# Patient Record
Sex: FEMALE | Race: WHITE | HISPANIC OR LATINO
[De-identification: names, ages, dates, MRNs, and addresses within clinical notes are randomized per-mention and may not be internally consistent; named-entity substitution may affect disease eponyms.]

---

## 2020-08-17 PROBLEM — Z00.00 ENCOUNTER FOR PREVENTIVE HEALTH EXAMINATION: Status: ACTIVE | Noted: 2020-08-17

## 2020-08-18 ENCOUNTER — ASOB RESULT (OUTPATIENT)
Age: 35
End: 2020-08-18

## 2020-08-18 ENCOUNTER — APPOINTMENT (OUTPATIENT)
Dept: ANTEPARTUM | Facility: CLINIC | Age: 35
End: 2020-08-18
Payer: MEDICAID

## 2020-08-18 PROCEDURE — 76811 OB US DETAILED SNGL FETUS: CPT

## 2020-09-14 ENCOUNTER — APPOINTMENT (OUTPATIENT)
Dept: ANTEPARTUM | Facility: CLINIC | Age: 35
End: 2020-09-14

## 2022-04-09 ENCOUNTER — EMERGENCY (EMERGENCY)
Facility: HOSPITAL | Age: 37
LOS: 1 days | Discharge: HOME | End: 2022-04-09
Attending: EMERGENCY MEDICINE | Admitting: STUDENT IN AN ORGANIZED HEALTH CARE EDUCATION/TRAINING PROGRAM
Payer: MEDICAID

## 2022-04-09 VITALS
SYSTOLIC BLOOD PRESSURE: 144 MMHG | TEMPERATURE: 98 F | HEART RATE: 86 BPM | RESPIRATION RATE: 16 BRPM | OXYGEN SATURATION: 100 % | DIASTOLIC BLOOD PRESSURE: 70 MMHG

## 2022-04-09 LAB
ALBUMIN SERPL ELPH-MCNC: 4.6 G/DL — SIGNIFICANT CHANGE UP (ref 3.3–5)
ALP SERPL-CCNC: 97 U/L — SIGNIFICANT CHANGE UP (ref 40–120)
ALT FLD-CCNC: 9 U/L — SIGNIFICANT CHANGE UP (ref 4–33)
ANION GAP SERPL CALC-SCNC: 16 MMOL/L — HIGH (ref 7–14)
APPEARANCE UR: CLEAR — SIGNIFICANT CHANGE UP
AST SERPL-CCNC: 15 U/L — SIGNIFICANT CHANGE UP (ref 4–32)
B PERT DNA SPEC QL NAA+PROBE: SIGNIFICANT CHANGE UP
B PERT+PARAPERT DNA PNL SPEC NAA+PROBE: SIGNIFICANT CHANGE UP
BASE EXCESS BLDV CALC-SCNC: -1.2 MMOL/L — SIGNIFICANT CHANGE UP (ref -2–3)
BASOPHILS # BLD AUTO: 0.02 K/UL — SIGNIFICANT CHANGE UP (ref 0–0.2)
BASOPHILS NFR BLD AUTO: 0.2 % — SIGNIFICANT CHANGE UP (ref 0–2)
BILIRUB SERPL-MCNC: 0.2 MG/DL — SIGNIFICANT CHANGE UP (ref 0.2–1.2)
BILIRUB UR-MCNC: NEGATIVE — SIGNIFICANT CHANGE UP
BLOOD GAS VENOUS COMPREHENSIVE RESULT: SIGNIFICANT CHANGE UP
BORDETELLA PARAPERTUSSIS (RAPRVP): SIGNIFICANT CHANGE UP
BUN SERPL-MCNC: 9 MG/DL — SIGNIFICANT CHANGE UP (ref 7–23)
C PNEUM DNA SPEC QL NAA+PROBE: SIGNIFICANT CHANGE UP
CALCIUM SERPL-MCNC: 8.8 MG/DL — SIGNIFICANT CHANGE UP (ref 8.4–10.5)
CHLORIDE BLDV-SCNC: 110 MMOL/L — HIGH (ref 96–108)
CHLORIDE SERPL-SCNC: 107 MMOL/L — SIGNIFICANT CHANGE UP (ref 98–107)
CO2 BLDV-SCNC: 22.2 MMOL/L — SIGNIFICANT CHANGE UP (ref 22–26)
CO2 SERPL-SCNC: 18 MMOL/L — LOW (ref 22–31)
COLOR SPEC: COLORLESS — SIGNIFICANT CHANGE UP
CREAT SERPL-MCNC: 0.51 MG/DL — SIGNIFICANT CHANGE UP (ref 0.5–1.3)
DIFF PNL FLD: NEGATIVE — SIGNIFICANT CHANGE UP
EGFR: 123 ML/MIN/1.73M2 — SIGNIFICANT CHANGE UP
EOSINOPHIL # BLD AUTO: 0.02 K/UL — SIGNIFICANT CHANGE UP (ref 0–0.5)
EOSINOPHIL NFR BLD AUTO: 0.2 % — SIGNIFICANT CHANGE UP (ref 0–6)
FLUAV SUBTYP SPEC NAA+PROBE: SIGNIFICANT CHANGE UP
FLUBV RNA SPEC QL NAA+PROBE: SIGNIFICANT CHANGE UP
GAS PNL BLDV: 138 MMOL/L — SIGNIFICANT CHANGE UP (ref 136–145)
GLUCOSE BLDV-MCNC: 100 MG/DL — HIGH (ref 70–99)
GLUCOSE SERPL-MCNC: 105 MG/DL — HIGH (ref 70–99)
GLUCOSE UR QL: NEGATIVE — SIGNIFICANT CHANGE UP
HADV DNA SPEC QL NAA+PROBE: SIGNIFICANT CHANGE UP
HCO3 BLDV-SCNC: 21 MMOL/L — LOW (ref 22–29)
HCOV 229E RNA SPEC QL NAA+PROBE: SIGNIFICANT CHANGE UP
HCOV HKU1 RNA SPEC QL NAA+PROBE: SIGNIFICANT CHANGE UP
HCOV NL63 RNA SPEC QL NAA+PROBE: SIGNIFICANT CHANGE UP
HCOV OC43 RNA SPEC QL NAA+PROBE: SIGNIFICANT CHANGE UP
HCT VFR BLD CALC: 32.9 % — LOW (ref 34.5–45)
HCT VFR BLDA CALC: 33 % — LOW (ref 34.5–46.5)
HETEROPH AB TITR SER AGGL: NEGATIVE — SIGNIFICANT CHANGE UP
HGB BLD CALC-MCNC: 11 G/DL — LOW (ref 11.5–15.5)
HGB BLD-MCNC: 10.9 G/DL — LOW (ref 11.5–15.5)
HMPV RNA SPEC QL NAA+PROBE: SIGNIFICANT CHANGE UP
HPIV1 RNA SPEC QL NAA+PROBE: SIGNIFICANT CHANGE UP
HPIV2 RNA SPEC QL NAA+PROBE: SIGNIFICANT CHANGE UP
HPIV3 RNA SPEC QL NAA+PROBE: SIGNIFICANT CHANGE UP
HPIV4 RNA SPEC QL NAA+PROBE: SIGNIFICANT CHANGE UP
IANC: 5.28 K/UL — SIGNIFICANT CHANGE UP (ref 1.8–7.4)
IMM GRANULOCYTES NFR BLD AUTO: 0.2 % — SIGNIFICANT CHANGE UP (ref 0–1.5)
KETONES UR-MCNC: ABNORMAL
LACTATE BLDV-MCNC: 1.6 MMOL/L — SIGNIFICANT CHANGE UP (ref 0.5–2)
LEUKOCYTE ESTERASE UR-ACNC: NEGATIVE — SIGNIFICANT CHANGE UP
LYMPHOCYTES # BLD AUTO: 2.46 K/UL — SIGNIFICANT CHANGE UP (ref 1–3.3)
LYMPHOCYTES # BLD AUTO: 29.1 % — SIGNIFICANT CHANGE UP (ref 13–44)
M PNEUMO DNA SPEC QL NAA+PROBE: SIGNIFICANT CHANGE UP
MAGNESIUM SERPL-MCNC: 2 MG/DL — SIGNIFICANT CHANGE UP (ref 1.6–2.6)
MCHC RBC-ENTMCNC: 28.5 PG — SIGNIFICANT CHANGE UP (ref 27–34)
MCHC RBC-ENTMCNC: 33.1 GM/DL — SIGNIFICANT CHANGE UP (ref 32–36)
MCV RBC AUTO: 86.1 FL — SIGNIFICANT CHANGE UP (ref 80–100)
MONOCYTES # BLD AUTO: 0.64 K/UL — SIGNIFICANT CHANGE UP (ref 0–0.9)
MONOCYTES NFR BLD AUTO: 7.6 % — SIGNIFICANT CHANGE UP (ref 2–14)
NEUTROPHILS # BLD AUTO: 5.28 K/UL — SIGNIFICANT CHANGE UP (ref 1.8–7.4)
NEUTROPHILS NFR BLD AUTO: 62.7 % — SIGNIFICANT CHANGE UP (ref 43–77)
NITRITE UR-MCNC: NEGATIVE — SIGNIFICANT CHANGE UP
NRBC # BLD: 0 /100 WBCS — SIGNIFICANT CHANGE UP
NRBC # FLD: 0 K/UL — SIGNIFICANT CHANGE UP
PCO2 BLDV: 28 MMHG — LOW (ref 39–42)
PH BLDV: 7.49 — HIGH (ref 7.32–7.43)
PH UR: 6.5 — SIGNIFICANT CHANGE UP (ref 5–8)
PLATELET # BLD AUTO: 227 K/UL — SIGNIFICANT CHANGE UP (ref 150–400)
PO2 BLDV: 36 MMHG — SIGNIFICANT CHANGE UP
POTASSIUM BLDV-SCNC: 3.2 MMOL/L — LOW (ref 3.5–5.1)
POTASSIUM SERPL-MCNC: 3.6 MMOL/L — SIGNIFICANT CHANGE UP (ref 3.5–5.3)
POTASSIUM SERPL-SCNC: 3.6 MMOL/L — SIGNIFICANT CHANGE UP (ref 3.5–5.3)
PROT SERPL-MCNC: 7.5 G/DL — SIGNIFICANT CHANGE UP (ref 6–8.3)
PROT UR-MCNC: NEGATIVE — SIGNIFICANT CHANGE UP
RAPID RVP RESULT: SIGNIFICANT CHANGE UP
RBC # BLD: 3.82 M/UL — SIGNIFICANT CHANGE UP (ref 3.8–5.2)
RBC # FLD: 14.6 % — HIGH (ref 10.3–14.5)
RSV RNA SPEC QL NAA+PROBE: SIGNIFICANT CHANGE UP
RV+EV RNA SPEC QL NAA+PROBE: SIGNIFICANT CHANGE UP
SAO2 % BLDV: 69.6 % — SIGNIFICANT CHANGE UP
SARS-COV-2 RNA SPEC QL NAA+PROBE: SIGNIFICANT CHANGE UP
SODIUM SERPL-SCNC: 141 MMOL/L — SIGNIFICANT CHANGE UP (ref 135–145)
SODIUM UR-SCNC: 22 MMOL/L — SIGNIFICANT CHANGE UP
SP GR SPEC: 1 — SIGNIFICANT CHANGE UP (ref 1–1.05)
TSH SERPL-MCNC: 1.5 UIU/ML — SIGNIFICANT CHANGE UP (ref 0.27–4.2)
UROBILINOGEN FLD QL: SIGNIFICANT CHANGE UP
WBC # BLD: 8.44 K/UL — SIGNIFICANT CHANGE UP (ref 3.8–10.5)
WBC # FLD AUTO: 8.44 K/UL — SIGNIFICANT CHANGE UP (ref 3.8–10.5)

## 2022-04-09 PROCEDURE — 93010 ELECTROCARDIOGRAM REPORT: CPT

## 2022-04-09 PROCEDURE — 70487 CT MAXILLOFACIAL W/DYE: CPT | Mod: 26,MA

## 2022-04-09 PROCEDURE — 99285 EMERGENCY DEPT VISIT HI MDM: CPT | Mod: 25

## 2022-04-09 PROCEDURE — 71046 X-RAY EXAM CHEST 2 VIEWS: CPT | Mod: 26

## 2022-04-09 RX ORDER — POTASSIUM PHOSPHATE, MONOBASIC POTASSIUM PHOSPHATE, DIBASIC 236; 224 MG/ML; MG/ML
15 INJECTION, SOLUTION INTRAVENOUS ONCE
Refills: 0 | Status: COMPLETED | OUTPATIENT
Start: 2022-04-09 | End: 2022-04-09

## 2022-04-09 RX ORDER — CALCIUM GLUCONATE 100 MG/ML
2 VIAL (ML) INTRAVENOUS ONCE
Refills: 0 | Status: COMPLETED | OUTPATIENT
Start: 2022-04-09 | End: 2022-04-09

## 2022-04-09 RX ORDER — SODIUM CHLORIDE 9 MG/ML
1000 INJECTION INTRAMUSCULAR; INTRAVENOUS; SUBCUTANEOUS ONCE
Refills: 0 | Status: COMPLETED | OUTPATIENT
Start: 2022-04-09 | End: 2022-04-09

## 2022-04-09 RX ADMIN — SODIUM CHLORIDE 1000 MILLILITER(S): 9 INJECTION INTRAMUSCULAR; INTRAVENOUS; SUBCUTANEOUS at 21:17

## 2022-04-09 RX ADMIN — POTASSIUM PHOSPHATE, MONOBASIC POTASSIUM PHOSPHATE, DIBASIC 62.5 MILLIMOLE(S): 236; 224 INJECTION, SOLUTION INTRAVENOUS at 21:17

## 2022-04-09 RX ADMIN — Medication 200 GRAM(S): at 23:52

## 2022-04-09 NOTE — ED PROVIDER NOTE - PROGRESS NOTE DETAILS
Salo Forbes PGY2: Work up thus far consistent with hypocalcemia and hypophosphatemia. Pt accepted for obs overnight for ongoing repletion. Plan for endo consult in AM. Pt remains HDS. Resting in bed comfortably.

## 2022-04-09 NOTE — ED PROVIDER NOTE - CLINICAL SUMMARY MEDICAL DECISION MAKING FREE TEXT BOX
37 yo F with a pmhx of HTN presents to the ED with generalized weakness over the last 4 days. contrary to triage noted, She denies any dental pain at bedside. vitals non actionable at this time. PE as noted above. lethargic appearing. chvostek sign positive. concerns for metabolic derangement vs endocrine disorder. unlikely dental cause of lethargy. will order labs, imaging, ekg, reassess 35 yo F with a pmhx of HTN presents to the ED with generalized weakness over the last 4 days. contrary to triage noted, She denies any dental pain at bedside. vitals non actionable at this time. PE as noted above. lethargic appearing. chvostek sign positive. concerns for metabolic derangement vs endocrine disorder. unlikely dental cause of lethargy. will order labs, imaging, ekg, reassess    haughey: pt as per note from triage has dental pain but pt denies dental pain, [facility] was given amoxicillin (for dental pain?).  here no dental abnormalities on exam, no facial/ dental swelling.  instead pt appears generalizably weak, with mild hyperventilation.  pt with hyperreflexives in quadricepts, brisk responses.  but also with chovstek's sign with tapped over facial nerve.  pt laying in bed with weakness in moving.  pt g6, p6, breastfeeding infant at home.  pt awake and alert and interactive.  given concernes for electrolyte disorder given chovstek's sign and no labs have returned upgraded from intake to ED main.  care transferred to Dr. Farrar. awaiting labs and disposition.  additional labs sent given concern for hypocalcemia.

## 2022-04-09 NOTE — ED ADULT NURSE NOTE - NSIMPLEMENTINTERV_GEN_ALL_ED
Implemented All Fall with Harm Risk Interventions:  [place] to call system. Call bell, personal items and telephone within reach. Instruct patient to call for assistance. Room bathroom lighting operational. Non-slip footwear when patient is off stretcher. Physically safe environment: no spills, clutter or unnecessary equipment. Stretcher in lowest position, wheels locked, appropriate side rails in place. Provide visual cue, wrist band, yellow gown, etc. Monitor gait and stability. Monitor for mental status changes and reorient to person, place, and time. Review medications for side effects contributing to fall risk. Reinforce activity limits and safety measures with patient and family. Provide visual clues: red socks.

## 2022-04-09 NOTE — ED PROVIDER NOTE - OBJECTIVE STATEMENT
35 yo F with a pmhx of HTN presents to the ED with generalized weakness over the last 4 days. contrary to triage noted, She denies any dental pain at bedside. She admits to oral paresthesia. She admits to increased difficulty swallowing. She denies any abdominal pain. She was seen in [place] ED a few days ago and was dc for follow up with dental. She admits to increased lethargy and generalized weakness. She is able to tolerate po without difficulty. She denies any fevers, chills, N/V/D. She denies any viral prodrome. She denies any CP, SOB, headachces, fnd. She denies any other symptoms at bedside.

## 2022-04-09 NOTE — ED ADULT TRIAGE NOTE - CHIEF COMPLAINT QUOTE
c/o dental pain for 2x weeks, pt c/o throat tightness, no drooling or tongue swelling noted, denies difficulty breathing, able to clear secretions

## 2022-04-09 NOTE — ED ADULT NURSE NOTE - OBJECTIVE STATEMENT
Received pt to intake 10B.   Pt awake, alert and oriented x 4 presents for lethargy, weakness and dental pain seen at OSH yesterday and started on PO abx.    Pt denies fever, but c/o vomiting and diarrhea with decreased PO.    Frequent urine output with very dilute urine.     Urine sent as ordered.    Denies URI symptoms.  Some tachypnea noted but pt also reports feeling anxious.   VSS.    PMH HTN.   Denies pain/burning with urination.    Pt pale in appearance requiring wheelchair to use bathroom due to weakness and paresthesia.    IV and blood work complete.    EKG and repeat EKG complete.    BGL as charted.   Spoke to charge RN at [phone] for placement in main ER.

## 2022-04-09 NOTE — ED ADULT NURSE REASSESSMENT NOTE - NS ED NURSE REASSESS COMMENT FT1
Break coverage RN: Report received from intake RN. PT received to spot 24A. Denies pain at this time: endorses weakness fatigue. PT placed on cardiac monitor: NSR on CM. PT primarily Vietnamese speaking, requesting in English for family at bedside to translate and refusing  services.

## 2022-04-09 NOTE — ED PROVIDER NOTE - ATTENDING CONTRIBUTION TO CARE
jose francisco: pt as per note from triage has dental pain but pt denies dental pain, she has recently been to [place], was given amoxicillin (for dental pain?).  here no dental abnormalities on exam, no facial/ dental swelling.  instead pt appears generalizably weak, with mild hyperventilation.  pt with hyperreflexives in quadricepts, brisk responses.  but also with chovstek's sign with tapped over facial nerve.  pt laying in bed with weakness in moving.  pt g6, p6, breastfeeding infant at home.  pt awake and alert and interactive.  given concernes for electrolyte disorder given chovstek's sign and no labs have returned upgraded from intake to ED main.  care transferred to Dr. Farrar. awaiting labs and disposition.  additional labs sent given concern for hypocalcemia.    I performed a history and physical exam of the patient and discussed their management with the resident and /or advanced care provider. I reviewed the resident and /or ACP's note and agree with the documented findings and plan of care. My medical decison making and observations are found above.

## 2022-04-09 NOTE — ED PROVIDER NOTE - PHYSICAL EXAMINATION
GENERAL: no acute distress  HEAD: normocephalic, atraumatic  HEENT: normal conjunctiva, oral mucosa moist, neck supple  CARDIAC: regular rate and rhythm, normal S1 and S2,  no appreciable murmurs  PULM: clear to ascultation bilaterally, no crackles, rales, rhonchi, or wheezing  GI: abdomen nondistended, soft, nontender, no guarding or rebound tenderness  : no CVA tenderness, no suprapubic tenderness  NEURO: alert and oriented x 3, lethargic appearing, (+) Chvostek's sign   MSK: no visible deformities, no peripheral edema, calf tenderness/redness/swelling  SKIN: no visible rashes, dry, well-perfused  PSYCH: appropriate mood and affect

## 2022-04-10 ENCOUNTER — EMERGENCY (EMERGENCY)
Facility: HOSPITAL | Age: 37
LOS: 1 days | Discharge: HOME | End: 2022-04-10
Attending: STUDENT IN AN ORGANIZED HEALTH CARE EDUCATION/TRAINING PROGRAM | Admitting: STUDENT IN AN ORGANIZED HEALTH CARE EDUCATION/TRAINING PROGRAM
Payer: MEDICAID

## 2022-04-10 VITALS
DIASTOLIC BLOOD PRESSURE: 75 MMHG | OXYGEN SATURATION: 98 % | SYSTOLIC BLOOD PRESSURE: 111 MMHG | TEMPERATURE: 99 F | RESPIRATION RATE: 18 BRPM | HEART RATE: 66 BPM

## 2022-04-10 VITALS
TEMPERATURE: 98 F | HEART RATE: 84 BPM | SYSTOLIC BLOOD PRESSURE: 137 MMHG | OXYGEN SATURATION: 99 % | DIASTOLIC BLOOD PRESSURE: 65 MMHG | RESPIRATION RATE: 15 BRPM

## 2022-04-10 DIAGNOSIS — E83.51 HYPOCALCEMIA: ICD-10-CM

## 2022-04-10 LAB
24R-OH-CALCIDIOL SERPL-MCNC: 8 NG/ML — LOW (ref 30–80)
ANION GAP SERPL CALC-SCNC: 12 MMOL/L — SIGNIFICANT CHANGE UP (ref 7–14)
BUN SERPL-MCNC: 8 MG/DL — SIGNIFICANT CHANGE UP (ref 7–23)
CA-I BLD-SCNC: 1.13 MMOL/L — LOW (ref 1.15–1.29)
CALCIUM SERPL-MCNC: 8.3 MG/DL — LOW (ref 8.4–10.5)
CHLORIDE SERPL-SCNC: 111 MMOL/L — HIGH (ref 98–107)
CO2 SERPL-SCNC: 20 MMOL/L — LOW (ref 22–31)
COHGB MFR BLDV: 0.8 % — SIGNIFICANT CHANGE UP
CREAT SERPL-MCNC: 0.5 MG/DL — SIGNIFICANT CHANGE UP (ref 0.5–1.3)
CULTURE RESULTS: SIGNIFICANT CHANGE UP
EGFR: 124 ML/MIN/1.73M2 — SIGNIFICANT CHANGE UP
GLUCOSE SERPL-MCNC: 87 MG/DL — SIGNIFICANT CHANGE UP (ref 70–99)
HGB BLD CALC-MCNC: 10.1 G/DL — LOW (ref 11.5–15.5)
MAGNESIUM SERPL-MCNC: 2 MG/DL — SIGNIFICANT CHANGE UP (ref 1.6–2.6)
PHOSPHATE SERPL-MCNC: 4.6 MG/DL — HIGH (ref 2.5–4.5)
POTASSIUM SERPL-MCNC: 3.9 MMOL/L — SIGNIFICANT CHANGE UP (ref 3.5–5.3)
POTASSIUM SERPL-SCNC: 3.9 MMOL/L — SIGNIFICANT CHANGE UP (ref 3.5–5.3)
SODIUM SERPL-SCNC: 143 MMOL/L — SIGNIFICANT CHANGE UP (ref 135–145)
SPECIMEN SOURCE: SIGNIFICANT CHANGE UP

## 2022-04-10 PROCEDURE — 99236 HOSP IP/OBS SAME DATE HI 85: CPT

## 2022-04-10 PROCEDURE — 99284 EMERGENCY DEPT VISIT MOD MDM: CPT

## 2022-04-10 RX ORDER — ERGOCALCIFEROL 1.25 MG/1
1 CAPSULE ORAL
Qty: 24 | Refills: 0
Start: 2022-04-10

## 2022-04-10 RX ORDER — ERGOCALCIFEROL 1.25 MG/1
1 CAPSULE ORAL
Qty: 12 | Refills: 0
Start: 2022-04-10

## 2022-04-10 RX ORDER — ERGOCALCIFEROL 1.25 MG/1
50000 CAPSULE ORAL
Refills: 0 | Status: DISCONTINUED | OUTPATIENT
Start: 2022-04-10 | End: 2022-04-13

## 2022-04-10 RX ORDER — POTASSIUM PHOSPHATE, MONOBASIC POTASSIUM PHOSPHATE, DIBASIC 236; 224 MG/ML; MG/ML
15 INJECTION, SOLUTION INTRAVENOUS ONCE
Refills: 0 | Status: COMPLETED | OUTPATIENT
Start: 2022-04-10 | End: 2022-04-10

## 2022-04-10 RX ORDER — SODIUM CHLORIDE 9 MG/ML
1000 INJECTION INTRAMUSCULAR; INTRAVENOUS; SUBCUTANEOUS
Refills: 0 | Status: DISCONTINUED | OUTPATIENT
Start: 2022-04-10 | End: 2022-04-13

## 2022-04-10 RX ORDER — CALCIUM CARBONATE 500(1250)
1 TABLET ORAL
Qty: 10 | Refills: 0
Start: 2022-04-10 | End: 2022-04-14

## 2022-04-10 RX ORDER — CALCIUM CARBONATE 500(1250)
1 TABLET ORAL ONCE
Refills: 0 | Status: COMPLETED | OUTPATIENT
Start: 2022-04-10 | End: 2022-04-10

## 2022-04-10 RX ADMIN — Medication 1 TABLET(S): at 18:05

## 2022-04-10 RX ADMIN — Medication 1 TABLET(S): at 18:04

## 2022-04-10 RX ADMIN — SODIUM CHLORIDE 150 MILLILITER(S): 9 INJECTION INTRAMUSCULAR; INTRAVENOUS; SUBCUTANEOUS at 06:14

## 2022-04-10 RX ADMIN — ERGOCALCIFEROL 50000 UNIT(S): 1.25 CAPSULE ORAL at 18:05

## 2022-04-10 RX ADMIN — Medication 1 TABLET(S): at 10:54

## 2022-04-10 RX ADMIN — POTASSIUM PHOSPHATE, MONOBASIC POTASSIUM PHOSPHATE, DIBASIC 62.5 MILLIMOLE(S): 236; 224 INJECTION, SOLUTION INTRAVENOUS at 01:43

## 2022-04-10 NOTE — ED CDU PROVIDER DISPOSITION NOTE - PATIENT PORTAL LINK FT
You can access the FollowMyHealth Patient Portal offered by [facility] by registering at the following website: [email or web address]. By joining [employer]’s FollowMyHealth portal, you will also be able to view your health information using other applications (apps) compatible with our system.

## 2022-04-10 NOTE — ED CDU PROVIDER INITIAL DAY NOTE - PHYSICAL EXAMINATION
CONSTITUTIONAL:  Well appearing, awake, alert, oriented to person, place, time/situation and in no apparent distress.  Pt. is objectively comfortable appearing and verbalizing in full, clear, effortless sentences.  ENMT: NC/AT.  Airway patent.  Nasal mucosa clear.  Moist mucous membranes.  Neck supple.  EYES:  Clear OU.  CARDIAC:  Normal rate, regular rhythm.  Heart sounds S1 S2.  No murmurs, gallops, or rubs.  RESPIRATORY:  Breath sounds clear and equal bilaterally.  No wheezes, no rales, no rhonchi.  GASTROINTESTINAL:  Abdomen soft, non-distended, non-tender.  No rebound, no guarding.  NEUROLOGICAL:  Alert and oriented to person/place/time/situation.  No focal deficits.  MUSCULOSKELETAL:  Range of motion is not limited.    SKIN:  Skin color unremarkable.  Skin warm, dry, and intact.    PSYCHIATRIC:  Alert and oriented to person/place/time/situation.  Mood and affect WNL.  No apparent risk to self or others.

## 2022-04-10 NOTE — CONSULT NOTE ADULT - SUBJECTIVE AND OBJECTIVE BOX
HPI:  37 y/o F around 1 year postpartum presented to the ED with generalized weakness over the last 4 days. Also reports some SOB and fatigue, increased lethargy and generalized weakness. She is able to tolerate po without difficulty. She denies any fevers, chills, N/V/D. She denies any viral prodrome. She denies any CP, SOB, HA. Denies stridor. CT in ED shows no abnormalities, patent airway.   Found to have low corrected calcium of 8.4. Currently breast feeding. Not on any calcium or vit D supplementation. No prior hx of hypocalcemia or calcium disorders. No hx of neck surgery or radiation to the head or neck. Has noticed some numbness around her mouth and tingling of the feet and toes. No cramping of her hands. Eats cheese and yogurt. Drinks milk. No recent infections. No autoimmune disorders. No family hx of calcium disorders.          PAST MEDICAL & SURGICAL HISTORY:  HTN (hypertension)    No significant past surgical history        FAMILY HISTORY:  No pertinent family history in first degree relatives        Social History: No tobacco or alcohol use    Outpatient Medications:    MEDICATIONS  (STANDING):  amoxicillin  500 milliGRAM(s)/clavulanate 1 Tablet(s) Oral every 8 hours  ergocalciferol [id number] Unit(s) Oral every week  sodium chloride 0.9%. 1000 milliLiter(s) (150 mL/Hr) IV Continuous <Continuous>    MEDICATIONS  (PRN):      Allergies    No Known Allergies    Intolerances      Review of Systems:  Constitutional: No fever, No change in weight +fatigue  Eyes: No blurry vision  Neuro: No headache, +paresthesias mouth and feet  HEENT: No throat pain  Cardiovascular: No chest pain  Respiratory: +SOB  GI: No nausea or vomiting  : No polyuria  Skin: no rash  Psych: no depression  Endocrine: No polydipsia, No heat or cold intolerance, rest as noted in HPI  Hem/lymph: no swelling    All other review of systems negative      PHYSICAL EXAM:  VITALS: T(C): 36.9 (04-10-22 @ 14:30)  T(F): 98.5 (04-10-22 @ 14:30), Max: 99.6 (04-09-22 @ 21:00)  HR: 64 (04-10-22 @ 14:30) (61 - 73)  BP: 116/69 (04-10-22 @ 14:30) (116/69 - 135/70)  RR:  (17 - 20)  SpO2:  (98% - 100%)  Wt(kg): --  GENERAL: NAD at this time  EYES: No proptosis, EOMI  HEENT:  Atraumatic, Normocephalic,   THYROID: Normal size, no palpable nodules  RESPIRATORY: Clear to auscultation bilaterally, full excursion, non-labored  CARDIOVASCULAR: Regular rhythm; No murmurs; no peripheral edema  GI: Soft, nontender, non distended, normal bowel sounds  SKIN: Dry, intact, No rashes or lesions  MUSCULOSKELETAL: normal strength  NEURO: follows commands, neg. Chvotek's  PSYCH: Alert and oriented x 3, normal affect, normal mood  CUSHING'S SIGNS: no striae      POCT Blood Glucose.: 96 mg/dL (04-09-22 @ 16:31)                              10.9   8.44  )-----------( 227      ( 09 Apr 2022 16:58 )             32.9       04-10    143  |  111<H>  |  8   ----------------------------<  87  3.9   |  20<L>  |  0.50    EGFR if : x   EGFR if non : x     Ca    8.3<L>      04-10  Mg     2.00     04-10  Phos  4.6     04-10    TPro  x   /  Alb  3.7  /  TBili  x   /  DBili  x   /  AST  x   /  ALT  x   /  AlkPhos  x   04-10    Thyroid Function Tests:  04-09 @ 16:58 TSH 1.50 FreeT4 -- T3 -- Anti TPO -- Anti Thyroglobulin Ab -- TSI --            Radiology:             
HPI:  Patient is a 36y Female with PMH significant for HTN presents to the ED with generalized weakness over the last 4 days. Also reports some SOB and fatigue, increased lethargy and generalized weakness. She is able to tolerate po without difficulty. She denies any fevers, chills, N/V/D. She denies any viral prodrome. She denies any CP, SOB, HA. Denies stridor. CT in ED shows no abnormalities, patent airway.       Physical Exam  T(C): 37.1 (04-09-22 @ 22:08), Max: 37.1 (04-09-22 @ 22:08)  HR: 73 (04-09-22 @ 22:08) (68 - 86)  BP: 126/89 (04-09-22 @ 22:08) (126/89 - 144/70)  RR: 17 (04-09-22 @ 22:08) (16 - 20)  SpO2: 100% (04-09-22 @ 22:08) (98% - 100%)  General: NAD, A+Ox3  Resp: No respiratory distress, stridor, or stertor  Voice quality: weak but normal  Face:  Symmetric without masses or lesions  OU: EOMI  OC/OP: tongue normal, floor of mouth wnl, no masses or lesions, OP clear  Neck: soft/flat  CNII-XII intact    Laryngoscopy Procedure: risks and benefits of laryngoscopy discussed with the patient. Patient unable to tolerate exam at this time.     A/P: 36y Female PMH HTN p/w fatigue, lethargy, weakness, SOB. CT in ED of neck shows no abnormalities, patent airway. Pt unable to tolerate FOE.  -low suspicion for upper airway etiology   -pt with significant systemic sx, would r/o metabolic vs. other etiology  -no acute ENT intervention at this time      --------------------------------------------------------------  Thank you for the consult,    Zulema Fernandez MD  Resident  Department of Otolaryngology - Head and Neck Surgery  Peds Page #[phone]  Adult Page #[phone]  ---------------------------------------------------------------
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
## 2022-04-10 NOTE — ED CDU PROVIDER DISPOSITION NOTE - NSFOLLOWUPCLINICS_GEN_ALL_ED_FT
[facility]  Endocrinology  [address] [place] [address]  Phone: [phone]  Fax:     [facility]  Endocrinology  [address] [place] [address]  Phone: [phone]  Fax: [phone]
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
## 2022-04-10 NOTE — ED CDU PROVIDER DISPOSITION NOTE - ATTENDING CONTRIBUTION TO CARE
Hero Cutler DO:  patient seen and evaluated with the PA.  I was present for key portions of the History & Physical, and I agree with the Impression & Plan. 37 yo f pw fatigue  and jaw pain and found to have electrolyte abnormalities. likely secondary to malnutrition. eval by felicita. treated. obtained SW assistance for pt, pt ready for dc.

## 2022-04-10 NOTE — ED CDU PROVIDER INITIAL DAY NOTE - ATTENDING CONTRIBUTION TO CARE
37 yo f past medical history htn presents with gen weakness. ED work up sig for hypoCa++, hypophos. exam as above. plan: replete lytes, ivf, endo eval, reassess.

## 2022-04-10 NOTE — ED CDU PROVIDER INITIAL DAY NOTE - MEDICAL DECISION MAKING DETAILS
Tele monitoring, electrolyte repletion, repeat labs, Endocrine consultation 4/10 daytime, general observation care / monitoring.

## 2022-04-10 NOTE — CONSULT NOTE ADULT - PROBLEM SELECTOR RECOMMENDATION 9
Due to vitamin D deficiency  Start ergocaliferol 50,000 IU weekly.  Can given calcium carbonate 600mg BID with food for the next 5 days until Vit D dose starts to help.  Expect improvement in calcium once vitamin D level is optimized to >30.  Follow-up as outpatient. Unclear if symptoms are related to hypocalcemia as hypocalcemia is mild.   Will sign off at this time. Please reconsult if needed.      Thomas Dietz D.O  [phone]

## 2022-04-10 NOTE — ED CDU PROVIDER INITIAL DAY NOTE - NS ED ATTENDING STATEMENT MOD
This was a shared visit with the MARIA ESTHER. I reviewed and verified the documentation and independently performed the documented:

## 2022-04-10 NOTE — ED CDU PROVIDER DISPOSITION NOTE - NSFOLLOWUPINSTRUCTIONS_ED_ALL_ED_FT
Follow up with your Primary Medical Doctor within 2-3days. If results or reports were given to you, show copies of your reports given to you. Take all of your medications as previously prescribed.    Start ergocaliferol 50,000 IU ( Vitamin D2) weekly.  Take Calcium carbonate 600mg BID with food for the next 5 days    If any worsening, new, concerning symptoms return to the ER.      Hipocalcemia en los adultos    Hypocalcemia, Adult      La hipocalcemia ocurre cuando el nivel de calcio en la greg de harmony persona está por debajo de lo normal. El calcio es un mineral que el organismo utiliza de muchas formas. No tener suficiente calcio en la greg puede afectar el sistema nervioso. [place] puede causar problemas en los músculos, el corazón y el cerebro.      ¿Cuáles son las causas?    Esta afección puede ser causada por lo siguiente:  •Deficiencia de vitamina D, de magnesio o de ambos.      •Disminución de los niveles de la hormona paratiroidea (hipoparatiroidismo).      •Problemas en la función renal.      •Bajos niveles de harmony proteína del organismo llamada albúmina.      •Inflamación del páncreas (pancreatitis).      •No graciela la cantidad suficiente de vitaminas y minerales en la dieta o tener problemas intestinales que interfieren con la absorción de nutrientes.      •Ciertos medicamentos.        ¿Cuáles son los signos o los síntomas?    Es posible que algunas personas no presenten síntomas, especialmente si tienen hipocalcemia desde hace un rodríguez tiempo (crónica).    Los síntomas de esta afección pueden incluir los siguientes:  •Entumecimiento y hormigueo de los dedos de los pies o alrededor de la boca.      •Espasmos, jackie o calambres musculares, especialmente en las piernas, los pies y la espalda.      •Espasmo en la laringe (laringoespasmo). [place] puede hacer que sea más difícil respirar o hablar.      •Latidos cardíacos rápidos (palpitaciones) y ritmo cardíaco anormal (arritmias).      •Sacudidas incontrolables (convulsiones).      •Problemas de memoria, confusión o dificultad para pensar.      •Depresión, ansiedad, irritabilidad o cambios en la personalidad.      Los síntomas a rodríguez plazo de esta afección pueden incluir los siguientes:  •Nabila y uñas gruesos y quebradizos.      •Piel seca o enfermedades de la piel de larga duración (psoriasis, eczema o dermatitis).      •Caries dentales.      •Opacidad en el cristalino del kayleen (cataratas).        ¿Cómo se diagnostica?     Por lo general, estefania trastorno se diagnostica mediante un análisis de greg. También se pueden hacer otros estudios para ayudar a determinar la causa preexistente de la afección. Estos pueden incluir más análisis de greg y estudios de diagnóstico por imágenes.      ¿Cómo se trata?    El tratamiento de esta afección puede incluir:  •Calcio administrado por boca (vía oral) o a través de harmony vía intravenosa. El método utilizado para la administración del calcio dependerá de la gravedad de la afección. Si la afección es grave, es posible que deban controlarlo de cerca en el hospital.      •Administración de otros minerales (electrolitos), alex el magnesio.      El uso de otros tratamientos dependerá de la causa de la afección.      Siga estas instrucciones en gutiérrez casa:    •Siga las instrucciones del médico o nutricionista en lo que respecta a la dieta.      •Clementon los suplementos solamente alex se lo haya indicado el médico.      •Concurra a todas las visitas de seguimiento alex se lo haya indicado el médico. [place] es importante.        Comuníquese con un médico si:    •Tiene un aumento de los espasmos o calambres musculares.      •Observa harmony nueva hinchazón en los pies, tobillos o piernas.      •Sufre cambios en el estado de ánimo, en la memoria o en la personalidad.        Solicite ayuda inmediatamente si:    •Siente dolor en el pecho.      •Siente latidos cardíacos rápidos o irregulares de manera persistente.      •Presenta dificultad para respirar.      •Se desmaya.      •Comienza a tener convulsiones.      •Experimenta confusión.        Resumen    •La hipocalcemia ocurre cuando el nivel de calcio en la greg de harmony persona está por debajo de lo normal. No tener suficiente calcio en la greg puede afectar el sistema nervioso. [place] puede causar problemas en los músculos, el corazón y el cerebro.      •Esta afección puede tratarse con la administración de calcio por boca o por vía intravenosa, la administración de otros minerales y el tratamiento de la causa subyacente de la hipocalcemia.      •Clementon los suplementos solamente alex se lo haya indicado el médico.      •Comuníquese con un médico si tiene síntomas nuevos o los síntomas empeoran.      •Concurra a todas las visitas de seguimiento alex se lo haya indicado el médico. [place] es importante.      Esta información no tiene alex fin reemplazar el consejo del médico. Asegúrese de hacerle al médico cualquier pregunta que tenga.        Deficiencia de vitamina D    Vitamin D Deficiency      La deficiencia de vitamina D ocurre cuando el organismo no tiene suficiente vitamina D. La vitamina D es importante para el organismo por muchos motivos:  •Ayuda al organismo a absorber calcio y fósforo, dos minerales importantes.      •Desempeña un papel en la ivonne de los huesos.      •Puede ayudar a prevenir algunas enfermedades, alex la diabetes y la esclerosis múltiple.      •Desempeña un papel en la función muscular, incluida la actividad cardíaca.      Si la deficiencia de vitamina D es grave, puede causar harmony enfermedad que reblandece los huesos. En los adultos, se la conoce alex osteomalacia. En los niños, recibe el nombre de raquitismo.      ¿Cuáles son las causas?    Esta afección puede ser causada por lo siguiente:  •No comer suficientes alimentos que contengan vitamina D.      •No estar expuesto al sol lo suficiente.      •Sufrir ciertas enfermedades del aparato digestivo que le dificultan al organismo la absorción de vitamina D. Estas enfermedades incluyen la enfermedad de Crohn, la pancreatitis crónica y la fibrosis quística.      •Someterse a harmony cirugía en la que se extirpa harmony parte del estómago o del intestino salmon.      •Tener enfermedad renal crónica o enfermedad hepática.        ¿Qué incrementa el riesgo?    Es más probable que desarrolle esta afección en los siguientes casos:  •Es de edad avanzada.      •No pasa mucho tiempo al aire phillip.      •Vive en un centro de atención a rodríguez plazo.      •Ha tenido fracturas de huesos.      •Tiene huesos débiles o finos (osteoporosis).      •Tiene harmony enfermedad o un trastorno que modifica la forma en que el organismo absorbe la vitamina D.      •Tiene piel oscura.      •Lena algunos medicamentos, alex corticoesteroides o determinados anticonvulsivos.      •Tiene sobrepeso u obesidad.        ¿Cuáles son los signos o los síntomas?    En los casos leves de deficiencia de vitamina D, puede no mariano síntomas. Si el trastorno es grave, los síntomas pueden incluir lo siguiente:  •Dolor en los huesos.      •Dolor muscular.      •Caerse con frecuencia.      •Huesos fracturados por harmony lesión rodri.        ¿Cómo se diagnostica?    Esta afección se puede diagnosticar con análisis de greg. También se pueden realizar pruebas de diagnóstico por imágenes, alex radiografías, para detectar cambios en el hueso.      ¿Cómo se trata?    El tratamiento de esta afección puede depender de la causa. Las opciones de tratamiento incluyen las siguientes:  •Graciela suplementos de vitamina D. El médico le sugerirá cuál es la dosis más adecuada para usted.      •Graciela un suplemento de calcio. El médico le sugerirá cuál es la dosis más adecuada para usted.        Siga estas instrucciones en gutiérrez casa:      Comida y bebida    •Consuma alimentos que contengan vitamina D, alex por ejemplo:  •Productos lácteos, cereales o jugos fortificados. El término fortificado significa que se ha añadido vitamina D al alimento. Revise la etiqueta del envase para cynthia si el alimento es fortificado.      •Pescados grasos, alex el salmón o la trucha.      •Huevos.      •Ostras.      •Hongos.        Es posible que los productos mencionados arriba no formen harmony lista completa de las bebidas o los alimentos recomendados. Consulte a un nutricionista para obtener más información.     Instrucciones generales     •Clementon los medicamentos y los suplementos solamente alex se lo haya indicado el médico.      •Reciba yoan solar natural de forma regular y oliveira.      • No utilice griffin juan david.      •Mantenga un peso saludable. Baje de peso, si es necesario.      •Concurra a todas las visitas de seguimiento alex se lo haya indicado el médico. [place] es importante.        ¿Cómo se bhavana?    Para obtener vitamina D, puede hacer lo siguiente:  •Consuma alimentos que contengan naturalmente vitamina D.      •Coma o wei productos que hayan sido fortificados con vitamina D, alex cereales, jugos y productos lácteos (incluida la leche).      •Clementon un suplemento de vitamina D o un suplemento multivitamínico que la contenga.      •Expóngase al sol. El organismo produce vitamina D de forma natural cuando se expone la piel a la yoan del sol. El organismo transforma la yoan solar en harmony forma de vitamina que puede utilizar.        Comuníquese con un médico si:    •Los síntomas no desaparecen.      •Siente náuseas o vomita.      •Tiene menos deposiciones de lo habitual o está estreñido.        Resumen    •La deficiencia de vitamina D ocurre cuando el organismo no tiene suficiente vitamina D.      •La vitamina D es importante para el organismo para la buena ivonne ósea y la función muscular, y puede ayudar a prevenir algunas enfermedades.      •La deficiencia de vitamina D se trata principalmente con suplementos. El médico le sugerirá cuál es la dosis más adecuada para usted.      •Puede obtener vitamina D al consumir alimentos que contengan vitamina D, al estar al sol y al graciela un suplemento de vitamina D o un suplemento multivitamínico que contenga vitamina D.      Esta información no tiene alex fin reemplazar el consejo del médico. Asegúrese de hacerle al médico cualquier pregunta que tenga.

## 2022-04-10 NOTE — ED ADULT TRIAGE NOTE - CHIEF COMPLAINT QUOTE
Pt Latvian speaking,  services utilized, ID# [id number], Geoffrey.  c/o "feeling nervous after breastfeeding my baby which causing me to feel SOB and tired."  Pt appears lethargic, unable to keep eyes open in triage.  Denies any chest pain/SOB.  Endorses dizziness and weakness.  Skin appear dry and pale.  Was discharged today from CDU, was diagnosed with hypocalcemia.  Denies any drugs/ETOH.  Pt is a poor historian.  PMHx:  HTN

## 2022-04-10 NOTE — PROVIDER CONTACT NOTE (OTHER) - ASSESSMENT
Medical team referred this case since pt requested to speak with SW and pt speaks Omani. Writer utilized [employer] ipad -  Lavonne # [id number]. as per the pt " there was a gas leak that's why I am here and I have 6 kids and they are home with my mom and kids father" I was  and had 4 kids with my first  but he passed away and with another man and have 2 kids. pt stated " I wants to get food stamp". pt stated " she is working with  department in the community and was planning to ask them to help her". Writer informed to call SNAP program and get an appoint to met with a live person or can go to SNAP office for further assistance.  pt stated she has seen rats near her house and pt was requested to speak with the land lord and call 311 to make a report. Writer suggested  to go to shelter but pt declined by saying  "my kids are fine and have no  problem or fear in going back to the community and has enough support".

## 2022-04-10 NOTE — CONSULT NOTE ADULT - ASSESSMENT
35 y/o F w/ mild hypocalcemia secondary to Vit D deficiency with Vit D 25-OH of 8 exacerbated likely by lactation

## 2022-04-11 VITALS
HEART RATE: 68 BPM | TEMPERATURE: 98 F | RESPIRATION RATE: 16 BRPM | OXYGEN SATURATION: 100 % | SYSTOLIC BLOOD PRESSURE: 110 MMHG | DIASTOLIC BLOOD PRESSURE: 68 MMHG

## 2022-04-11 LAB
ALBUMIN SERPL ELPH-MCNC: 4.4 G/DL — SIGNIFICANT CHANGE UP (ref 3.3–5)
ALP SERPL-CCNC: 93 U/L — SIGNIFICANT CHANGE UP (ref 40–120)
ALT FLD-CCNC: 11 U/L — SIGNIFICANT CHANGE UP (ref 4–33)
ANION GAP SERPL CALC-SCNC: 15 MMOL/L — HIGH (ref 7–14)
AST SERPL-CCNC: 18 U/L — SIGNIFICANT CHANGE UP (ref 4–32)
BASOPHILS # BLD AUTO: 0.03 K/UL — SIGNIFICANT CHANGE UP (ref 0–0.2)
BASOPHILS NFR BLD AUTO: 0.4 % — SIGNIFICANT CHANGE UP (ref 0–2)
BILIRUB SERPL-MCNC: 0.3 MG/DL — SIGNIFICANT CHANGE UP (ref 0.2–1.2)
BUN SERPL-MCNC: 8 MG/DL — SIGNIFICANT CHANGE UP (ref 7–23)
CALCIUM SERPL-MCNC: 9.4 MG/DL — SIGNIFICANT CHANGE UP (ref 8.4–10.5)
CHLORIDE SERPL-SCNC: 104 MMOL/L — SIGNIFICANT CHANGE UP (ref 98–107)
CO2 SERPL-SCNC: 21 MMOL/L — LOW (ref 22–31)
CREAT SERPL-MCNC: 0.52 MG/DL — SIGNIFICANT CHANGE UP (ref 0.5–1.3)
EBV EA AB SER IA-ACNC: <5 U/ML — SIGNIFICANT CHANGE UP
EBV EA AB TITR SER IF: NEGATIVE — SIGNIFICANT CHANGE UP
EBV EA IGG SER-ACNC: NEGATIVE — SIGNIFICANT CHANGE UP
EBV NA IGG SER IA-ACNC: <3 U/ML — SIGNIFICANT CHANGE UP
EBV PATRN SPEC IB-IMP: SIGNIFICANT CHANGE UP
EBV VCA IGG AVIDITY SER QL IA: POSITIVE
EBV VCA IGM SER IA-ACNC: 62.4 U/ML — HIGH
EBV VCA IGM SER IA-ACNC: 65.9 U/ML — HIGH
EBV VCA IGM SER IA-ACNC: <10 U/ML — SIGNIFICANT CHANGE UP
EBV VCA IGM TITR FLD: NEGATIVE — SIGNIFICANT CHANGE UP
EGFR: 123 ML/MIN/1.73M2 — SIGNIFICANT CHANGE UP
EOSINOPHIL # BLD AUTO: 0.03 K/UL — SIGNIFICANT CHANGE UP (ref 0–0.5)
EOSINOPHIL NFR BLD AUTO: 0.4 % — SIGNIFICANT CHANGE UP (ref 0–6)
GLUCOSE SERPL-MCNC: 109 MG/DL — HIGH (ref 70–99)
HCT VFR BLD CALC: 33.4 % — LOW (ref 34.5–45)
HGB BLD-MCNC: 10.8 G/DL — LOW (ref 11.5–15.5)
HIV-1 VIRAL LOAD RESULT: SIGNIFICANT CHANGE UP
HIV1 RNA # SERPL NAA+PROBE: SIGNIFICANT CHANGE UP COPIES/ML
HIV1 RNA SER-IMP: SIGNIFICANT CHANGE UP
HIV1 RNA SERPL NAA+PROBE-ACNC: SIGNIFICANT CHANGE UP
HIV1 RNA SERPL NAA+PROBE-LOG#: SIGNIFICANT CHANGE UP LG COP/ML
IANC: 5.02 K/UL — SIGNIFICANT CHANGE UP (ref 1.8–7.4)
IMM GRANULOCYTES NFR BLD AUTO: 0.3 % — SIGNIFICANT CHANGE UP (ref 0–1.5)
LYMPHOCYTES # BLD AUTO: 1.97 K/UL — SIGNIFICANT CHANGE UP (ref 1–3.3)
LYMPHOCYTES # BLD AUTO: 25.2 % — SIGNIFICANT CHANGE UP (ref 13–44)
MCHC RBC-ENTMCNC: 29.1 PG — SIGNIFICANT CHANGE UP (ref 27–34)
MCHC RBC-ENTMCNC: 32.3 GM/DL — SIGNIFICANT CHANGE UP (ref 32–36)
MCV RBC AUTO: 90 FL — SIGNIFICANT CHANGE UP (ref 80–100)
MONOCYTES # BLD AUTO: 0.75 K/UL — SIGNIFICANT CHANGE UP (ref 0–0.9)
MONOCYTES NFR BLD AUTO: 9.6 % — SIGNIFICANT CHANGE UP (ref 2–14)
NEUTROPHILS # BLD AUTO: 5.02 K/UL — SIGNIFICANT CHANGE UP (ref 1.8–7.4)
NEUTROPHILS NFR BLD AUTO: 64.1 % — SIGNIFICANT CHANGE UP (ref 43–77)
NRBC # BLD: 0 /100 WBCS — SIGNIFICANT CHANGE UP
NRBC # FLD: 0 K/UL — SIGNIFICANT CHANGE UP
PHOSPHATE SERPL-MCNC: 3.5 MG/DL — SIGNIFICANT CHANGE UP (ref 2.5–4.5)
PLATELET # BLD AUTO: 189 K/UL — SIGNIFICANT CHANGE UP (ref 150–400)
POTASSIUM SERPL-MCNC: 4.1 MMOL/L — SIGNIFICANT CHANGE UP (ref 3.5–5.3)
POTASSIUM SERPL-SCNC: 4.1 MMOL/L — SIGNIFICANT CHANGE UP (ref 3.5–5.3)
PROCALCITONIN SERPL-MCNC: 0.02 NG/ML — SIGNIFICANT CHANGE UP (ref 0.02–0.1)
PROT SERPL-MCNC: 7.3 G/DL — SIGNIFICANT CHANGE UP (ref 6–8.3)
RBC # BLD: 3.71 M/UL — LOW (ref 3.8–5.2)
RBC # FLD: 14.5 % — SIGNIFICANT CHANGE UP (ref 10.3–14.5)
SODIUM SERPL-SCNC: 140 MMOL/L — SIGNIFICANT CHANGE UP (ref 135–145)
VIT D25+D1,25 OH+D1,25 PNL SERPL-MCNC: 85.4 PG/ML — HIGH (ref 19.9–79.3)
WBC # BLD: 7.82 K/UL — SIGNIFICANT CHANGE UP (ref 3.8–10.5)
WBC # FLD AUTO: 7.82 K/UL — SIGNIFICANT CHANGE UP (ref 3.8–10.5)

## 2022-04-11 RX ORDER — SODIUM CHLORIDE 9 MG/ML
1000 INJECTION, SOLUTION INTRAVENOUS ONCE
Refills: 0 | Status: COMPLETED | OUTPATIENT
Start: 2022-04-11 | End: 2022-04-11

## 2022-04-11 RX ADMIN — SODIUM CHLORIDE 1000 MILLILITER(S): 9 INJECTION, SOLUTION INTRAVENOUS at 04:22

## 2022-04-11 RX ADMIN — SODIUM CHLORIDE 1000 MILLILITER(S): 9 INJECTION, SOLUTION INTRAVENOUS at 01:28

## 2022-04-11 RX ADMIN — Medication 0.5 MILLIGRAM(S): at 04:26

## 2022-04-11 NOTE — ED PROVIDER NOTE - PATIENT PORTAL LINK FT
Noam Pepper is a 74 year old male here for  Chief Complaint   Patient presents with   • Consultation     Prostate Cancer      Denies latex allergy or sensitivity.    Medication verified, no changes.  PCP and Pharmacy verified.    Social History     Tobacco Use   Smoking Status Former Smoker   • Packs/day: 0.00   • Types: Cigarettes   • Quit date: 10/22/1984   • Years since quittin.0   Smokeless Tobacco Never Used     Advance Directives Filed: No    ECOG:   ECOG [20 1348]   ECOG Performance Status 0       Height: No.  Ht Readings from Last 1 Encounters:   20 5' 7\" (1.702 m)     Weight:Yes, shoes on.  Wt Readings from Last 3 Encounters:   20 89.1 kg   20 95 kg   10/15/19 96.2 kg       BMI: There is no height or weight on file to calculate BMI.    REVIEW OF SYSTEMS  GENERAL:  Patient denies headache, fevers, chills, night sweats, dizziness, but complains of: excessive fatigue, change in appetite and weight loss  ALLERGIC/IMMUNOLOGIC: Verified allergies: Yes  EYES:  Patient denies significant visual difficulties, double vision, but complains of: blurred vision  ENT/MOUTH: Patient denies problems with hearing, sore throat, sinus drainage, mouth sores  ENDOCRINE:  Patient denies diabetes, thyroid disease, hormone replacement, hot flashes  HEMATOLOGIC/LYMPHATIC: Patient denies easy bruising, bleeding, tender lymph nodes, swollen lymph nodes  BREASTS: Patient denies abnormal masses of breast, nipple discharge, pain  RESPIRATORY:  Patient denies lung pain with breathing, cough, coughing up blood, shortness of breath, but complains of: shortness of breath with exertion  CARDIOVASCULAR:  Patient denies anginal chest pain, palpitations, shortness of breath when lying flat, peripheral edema  GASTROINTESTINAL: Patient denies abdominal pain , nausea, vomiting, diarrhea, GI bleeding, constipation, change in bowel habits, heartburn, sensation of feeling full, difficulty swallowing  : Patient  denies blood in the urine, burning with urination, frequency, urgency, hesitancy, but complains of: incontinence  MUSCULOSKELETAL:  Patient denies joint pain, bone pain, joint swelling, redness, decreased range of motion  SKIN:  Patient denies chronic rashes, inflammation, ulcerations, skin changes, itching  NEUROLOGIC:  Patient denies areas of focal weakness, abnormal gait, sensory problems, numbness, tingling, but complains of: loss of balance  PSYCHIATRIC: Patient denies insomnia, depression, anxiety     You can access the FollowMyHealth Patient Portal offered by [facility] by registering at the following website: [email or web address]. By joining [employer]’s FollowMyHealth portal, you will also be able to view your health information using other applications (apps) compatible with our system.

## 2022-04-11 NOTE — ED PROVIDER NOTE - NSFOLLOWUPCLINICS_GEN_ALL_ED_FT
[facility] - Primary Care  Primary Care  [address] [place] [address]  Phone: [phone]  Fax:

## 2022-04-11 NOTE — ED PROVIDER NOTE - NSFOLLOWUPINSTRUCTIONS_ED_ALL_ED_FT
Fatigue      If you have fatigue, you feel tired all the time and have a lack of energy or a lack of motivation. Fatigue may make it difficult to start or complete tasks because of exhaustion. In general, occasional or mild fatigue is often a normal response to activity or life. However, long-lasting (chronic) or extreme fatigue may be a symptom of a medical condition.    Follow these instructions at home:    General instructions     •Watch your fatigue for any changes.    •Go to bed and get up at the same time every day.    •Avoid fatigue by pacing yourself during the day and getting enough sleep at night.    •Maintain a healthy weight.    Medicines     •Take over-the-counter and prescription medicines only as told by your health care provider.    •Take a multivitamin, if told by your health care provider.     • Do not use herbal or dietary supplements unless they are approved by your health care provider.    Activity      •Exercise regularly, as told by your health care provider.    •Use or practice techniques to help you relax, such as yoga, radha chi, meditation, or massage therapy.    Eating and drinking      •Avoid heavy meals in the evening.    •Eat a well-balanced diet, which includes lean proteins, whole grains, plenty of fruits and vegetables, and low-fat dairy products.    •Avoid consuming too much caffeine.    •Avoid the use of alcohol.    •Drink enough fluid to keep your urine pale yellow.      Lifestyle     •Change situations that cause you stress. Try to keep your work and personal schedule in balance.      • Do not use any products that contain nicotine or tobacco, such as cigarettes and e-cigarettes. If you need help quitting, ask your health care provider.      • Do not use drugs.        Contact a health care provider if:    •Your fatigue does not get better.      •You have a fever.      •You suddenly lose or gain weight.      •You have headaches.      •You have trouble falling asleep or sleeping through the night.      •You feel angry, guilty, anxious, or sad.    •You are unable to have a bowel movement (constipation).    •Your skin is dry.    •You have swelling in your legs or another part of your body.    Get help right away if:    •You feel confused.    •Your vision is blurry.    •You feel faint or you pass out.    •You have a severe headache.    •You have severe pain in your abdomen, your back, or the area between your waist and hips (pelvis).    •You have chest pain, shortness of breath, or an irregular or fast heartbeat.    •You are unable to urinate, or you urinate less than normal.    •You have abnormal bleeding, such as bleeding from the rectum, vagina, nose, lungs, or nipples.    •You vomit blood.    •You have thoughts about hurting yourself or others.    If you ever feel like you may hurt yourself or others, or have thoughts about taking your own life, get help right away. You can go to your nearest emergency department or call:   • Your local emergency services (911 in the [place]     • A suicide crisis helpline, such as the National Suicide Prevention Lifeline at 1-861.337.6489. This is open 24 hours a day.     Summary    •If you have fatigue, you feel tired all the time and have a lack of energy or a lack of motivation.    •Fatigue may make it difficult to start or complete tasks because of exhaustion.    •Long-lasting (chronic) or extreme fatigue may be a symptom of a medical condition.    •Exercise regularly, as told by your health care provider.    •Change situations that cause you stress. Try to keep your work and personal schedule in balance.    This information is not intended to replace advice given to you by your health care provider. Make sure you discuss any questions you have with your health care provider.

## 2022-04-11 NOTE — ED PROVIDER NOTE - PHYSICAL EXAMINATION
GENERAL: tearful and anxious appearing   HEAD: normocephalic, atraumatic  HENT: airway intact, neck supple, oral mucosa dry  EYES: normal conjunctiva, EOMI, PERRL  CARDIAC: regular rate and rhythm, normal S1S2, no appreciable murmurs, 2+ pulses in UE/LE b/l  PULM: normal breath sounds, clear to ascultation bilaterally, no rales, rhonchi, wheezing  GI: abdomen nondistended, soft, nontender, no guarding, rebound tenderness  : no CVA tenderness b/l, no suprapubic tenderness  NEURO: no focal motor or sensory deficits, CN2-12 intact, AAOx3  MSK: no peripheral edema, no calf tenderness b/l  SKIN: well-perfused, extremities warm, no visible rashes

## 2022-04-11 NOTE — ED PROVIDER NOTE - PROGRESS NOTE DETAILS
Nikita, PGY2: patient noted to be tremulous w/ feelings of anxiety. Will trial 0.5 ativan and reassess Nikita, PGY2: patient reassessed after ativan and appears clinically improved. However, patient noted that she feels safe in the hospital and feels unsafe at home because she feels as though "something" might happen to her. Patient very non descriptive with her safety concerns. Will illicit SW help for further evaluation to determine appropriate and safe dispo Subhash CARABALLO: d/w patient, says feels improved. Denies fever, SOB, CP, abdominal pain, diarrhea, muscular aches, joint pain, LOC. Say has no safety concerns at home. SW interviewed before and determined the same. VS wnl. PE wnl. Discussed for discharge plan and F/U with PMD early for recommendations. Patient agrees.

## 2022-04-11 NOTE — ED PROVIDER NOTE - OBJECTIVE STATEMENT
36 F w/ history of HTN presents to the ED with generalized weakness, fatigue and SOB described as "being unable to catch breath secondary to anxiety". States she has been feeling extremely fatigued, anxious and sad for ~ 5 days. Was seen at [facility] and had a CDU stay for hypocalcemia and hypomagnesemia. Patient states at time of discharge, she felt well but when she returned home, she began to feel increasingly overwhelmed and anxious that were exacerbated by the yelling/cries of her children. Notes waxing and waning stress that has been worse as of late but no SI/HI, drug use or alcohol use.

## 2022-04-11 NOTE — ED PROVIDER NOTE - CLINICAL SUMMARY MEDICAL DECISION MAKING FREE TEXT BOX
36 F w/ history of HTN presents to the ED with generalized weakness, fatigue and SOB; VSS. Patient not tachycardic, hypoxic or tachypneic and is saturating well on RA. Patient appears clinically dry w/ depressed affect but no concerning signs for infection at this time. given recent d/c for electrolyte abnormalities, will provided IV hydration + reassess electrolyte abnormalities

## 2022-04-11 NOTE — PROVIDER CONTACT NOTE (OTHER) - ASSESSMENT
Sw met pt  at bedside, alone with  [id number] because provider recommends that pt is in need of social intervention because of her being afraid of returning home . Pt reported that she is afraid of returning home because she is not feeling well, experiences shortness of breath, faint and lack of strength. She is afraid that these symptoms could return while she is at home, and wants to feel better before leaving the hospital . She is more concerned about experiencing these symptoms while home. Pt stated that she is not in any kind of danger and does feel safe with her partner and other members of her household

## 2022-04-11 NOTE — ED PROVIDER NOTE - ATTENDING CONTRIBUTION TO CARE
35 yo f past medical history htn recent visit for weakness presents for weakness, fatigue, "shortness of breath," because of anxiety. denies SI HI, AH, VH.  bedside. exam as above. plan: labs, ivf, ativan prn, reassess. x

## 2022-04-11 NOTE — ED ADULT NURSE NOTE - OBJECTIVE STATEMENT
Pt arrives to room 27 A&Ox4 and ambulatory. Pt C/O new onset of weakness and fatigue. Pt states she is experiencing a new onset of anxiety and overwhelmed r/t her . States symptoms are worse when breast feeding. Pt d/peggy from CDU yesterday for electrolyte imablances. Pt denies SI/HI drug and alcohol use. 20G IV placed in LAC. NSR on cardiac monitor .MD mosley in progress.

## 2022-04-11 NOTE — ED ADULT NURSE NOTE - NSIMPLEMENTINTERV_GEN_ALL_ED
Implemented All Universal Safety Interventions:  Marcus to call system. Call bell, personal items and telephone within reach. Instruct patient to call for assistance. Room bathroom lighting operational. Non-slip footwear when patient is off stretcher. Physically safe environment: no spills, clutter or unnecessary equipment. Stretcher in lowest position, wheels locked, appropriate side rails in place.

## 2022-04-11 NOTE — ED PROVIDER NOTE - NS ED ROS FT
General: fatigue  HENT: denies nasal congestion, rhinorrhea  Eyes: denies visual changes, blurred vision  CV: denies chest pain, palpitations  Resp: SOB  Abdominal: denies nausea, vomiting, diarrhea, abdominal pain  : denies urinary pain or discharge  MSK: denies muscle aches, leg swelling  Neuro: denies headaches, numbness, tingling  Skin: denies rashes, bruises

## 2022-04-11 NOTE — ED ADULT NURSE NOTE - CHIEF COMPLAINT QUOTE
Pt Lao speaking,  services utilized, ID# [id number], Geoffrey.  c/o "feeling nervous after breastfeeding my baby which causing me to feel SOB and tired."  Pt appears lethargic, unable to keep eyes open in triage.  Denies any chest pain/SOB.  Endorses dizziness and weakness.  Skin appear dry and pale.  Was discharged today from CDU, was diagnosed with hypocalcemia.  Denies any drugs/ETOH.  Pt is a poor historian.  PMHx:  HTN

## 2022-04-12 LAB
GLUCOSE BLDC GLUCOMTR-MCNC: 101 MG/DL — HIGH (ref 70–99)

## 2022-11-15 NOTE — ED ADULT NURSE NOTE - NS ED NURSE RECORD ANOTHER VITAL SIGN
Nutrition Assessment   Reason for Consult/Assessment: Wound consult   Diagnosis and Hx: Reviewed  Pertinent Nutrition History: Hx of T2DM                                    Diet Order: Consistent carbohydrate         Diet tolerance: Tolerating with good appetite/intakes recorded   Food Allergies: Yes (Nitrates)    Demographic/Anthropometrics Information  Gender: male   Patient Age: 66 year old  Height:    Ht Readings from Last 1 Encounters:   11/12/22 5' 7\" (1.702 m)      Weight:   Wt Readings from Last 1 Encounters:   11/15/22 106.2 kg      BMI:   BMI Readings from Last 1 Encounters:   11/15/22 36.67 kg/m²          % Weight Change: Weight hx reviewed, stable           Estimated Needs:  Calculated Energy Needs: 9130-6816  kcal               Calculated protein needs: 123-154  g    Calculated Fluid Needs: 1ml/kcal              NFPE  Nutrition Focused Physical Exam  Physical Exam Completed: Yes (visual)  Body Fat  Overall Body Fat: Normal  Orbital Region: Normal  Upper Arm Region (Triceps/biceps): Normal  Thoracic and Lumbar Region (Rib/lower back/midaxillary line): Normal  Muscle Mass  Overall Muscle Mass: Normal  Temporal Region (Temporalis Muscle): Normal  Collarbone Region (Clavicle Bone, Pectoralis Major, Trapezius Muscle): Normal  Shoulder Region (Deltoid Muscle): Normal  Scapular Bone Region (Trapezius, Supraspinatus, Infraspinatus Muscles): Normal  Hand Region: Normal  Patellar Region (Quadriceps Muscle): Normal  Anterior Thigh Region (Quadriceps Muscle): Normal  Posterior Calf Region (Gastrocnemius Muscle): Normal  Skin Assessment/Wounds  Wounds Present: Wounds present             TREATMENT PLAN: Monitoring & Interventions   Intervention: Nutrition education, Nutrition supplement therapy                                                                     Nutrition supplement therapy: Sending max protein supplement w/ lunch daily (150kcal, 30 g protein)  Nutrition education: Discussed importance of managing  blood sugars, consuming adequate protein, vitamin C, zinc for wound healing.    Goal: Meet >/equal 75% estimated needs   Intervention goal status: Initiated  Time frame to achieve goal: By discharge     Dietitian will monitor: Food, beverage, and nutrient intake, Knowledge, beliefs, attitudes      Nutrition Diagnosis / PES  Nutrition Diagnosis: Increased nutrient needs  Related to: Increased nutritional demands for healing   As evidenced by: Calculated needs      Primary Nutrition Diagnosis status: New nutrition diagnosis    Secondary nutrition problem: Food and nutrition related knowledge deficit   Related to: Other (comments) (following a consistent carbohydrate diet) As evidenced by: Other (comments) (Patient report)   Secondary nutrition diagnosis status: New nutrition diagnosis   Yes

## 2023-07-28 NOTE — ED PROVIDER NOTE - NSICDXNOPASTSURGICALHX_GEN_ALL_ED
This medical record reflects one or more clinical indicators suggestive of morbid obesity. 360 Statement: Pt BMI currently at 41.02. Pt wt has increased during admittance. Pt previous wt was about 20 lbs lower. There is no morbid obesity DX at this time. BMI Findings:  Adult BMI Classifications: Morbid Obesity 40-44.9        Body mass index is 41.02 kg/m². See Nutrition note dated 7/28/23 for additional details. Completed nutrition assessment is viewable in the nutrition documentation. <-- Click to add NO significant Past Surgical History

## 2023-11-13 RX ORDER — IBUPROFEN 200 MG
1 TABLET ORAL
Qty: 0 | Refills: 0 | DISCHARGE

## 2025-05-31 ENCOUNTER — EMERGENCY (EMERGENCY)
Facility: HOSPITAL | Age: 40
LOS: 1 days | End: 2025-05-31
Attending: EMERGENCY MEDICINE | Admitting: EMERGENCY MEDICINE
Payer: MEDICAID

## 2025-05-31 VITALS
SYSTOLIC BLOOD PRESSURE: 110 MMHG | TEMPERATURE: 98 F | OXYGEN SATURATION: 98 % | RESPIRATION RATE: 18 BRPM | DIASTOLIC BLOOD PRESSURE: 54 MMHG | HEART RATE: 96 BPM

## 2025-05-31 VITALS
OXYGEN SATURATION: 99 % | DIASTOLIC BLOOD PRESSURE: 74 MMHG | HEART RATE: 115 BPM | RESPIRATION RATE: 24 BRPM | TEMPERATURE: 98 F | SYSTOLIC BLOOD PRESSURE: 142 MMHG

## 2025-05-31 LAB
ALBUMIN SERPL ELPH-MCNC: 4.3 G/DL — SIGNIFICANT CHANGE UP (ref 3.3–5)
ALP SERPL-CCNC: 71 U/L — SIGNIFICANT CHANGE UP (ref 40–120)
ALT FLD-CCNC: 12 U/L — SIGNIFICANT CHANGE UP (ref 4–33)
ANION GAP SERPL CALC-SCNC: 16 MMOL/L — HIGH (ref 7–14)
APPEARANCE UR: CLEAR — SIGNIFICANT CHANGE UP
AST SERPL-CCNC: 17 U/L — SIGNIFICANT CHANGE UP (ref 4–32)
BACTERIA # UR AUTO: ABNORMAL /HPF
BASOPHILS # BLD AUTO: 0.02 K/UL — SIGNIFICANT CHANGE UP (ref 0–0.2)
BASOPHILS NFR BLD AUTO: 0.2 % — SIGNIFICANT CHANGE UP (ref 0–2)
BILIRUB SERPL-MCNC: <0.2 MG/DL — SIGNIFICANT CHANGE UP (ref 0.2–1.2)
BILIRUB UR-MCNC: NEGATIVE — SIGNIFICANT CHANGE UP
BUN SERPL-MCNC: 11 MG/DL — SIGNIFICANT CHANGE UP (ref 7–23)
CALCIUM SERPL-MCNC: 8.8 MG/DL — SIGNIFICANT CHANGE UP (ref 8.4–10.5)
CAST: 0 /LPF — SIGNIFICANT CHANGE UP (ref 0–4)
CHLORIDE SERPL-SCNC: 106 MMOL/L — SIGNIFICANT CHANGE UP (ref 98–107)
CO2 SERPL-SCNC: 21 MMOL/L — LOW (ref 22–31)
COLOR SPEC: YELLOW — SIGNIFICANT CHANGE UP
CREAT SERPL-MCNC: 0.55 MG/DL — SIGNIFICANT CHANGE UP (ref 0.5–1.3)
DIFF PNL FLD: NEGATIVE — SIGNIFICANT CHANGE UP
EGFR: 118 ML/MIN/1.73M2 — SIGNIFICANT CHANGE UP
EGFR: 118 ML/MIN/1.73M2 — SIGNIFICANT CHANGE UP
EOSINOPHIL # BLD AUTO: 0.03 K/UL — SIGNIFICANT CHANGE UP (ref 0–0.5)
EOSINOPHIL NFR BLD AUTO: 0.3 % — SIGNIFICANT CHANGE UP (ref 0–6)
GLUCOSE SERPL-MCNC: 128 MG/DL — HIGH (ref 70–99)
GLUCOSE UR QL: NEGATIVE MG/DL — SIGNIFICANT CHANGE UP
HCG SERPL-ACNC: <1 MIU/ML — SIGNIFICANT CHANGE UP
HCT VFR BLD CALC: 31.9 % — LOW (ref 34.5–45)
HGB BLD-MCNC: 10.5 G/DL — LOW (ref 11.5–15.5)
IANC: 6.56 K/UL — SIGNIFICANT CHANGE UP (ref 1.8–7.4)
IMM GRANULOCYTES NFR BLD AUTO: 0.2 % — SIGNIFICANT CHANGE UP (ref 0–0.9)
KETONES UR QL: ABNORMAL MG/DL
LEUKOCYTE ESTERASE UR-ACNC: ABNORMAL
LYMPHOCYTES # BLD AUTO: 2.03 K/UL — SIGNIFICANT CHANGE UP (ref 1–3.3)
LYMPHOCYTES # BLD AUTO: 21.6 % — SIGNIFICANT CHANGE UP (ref 13–44)
MAGNESIUM SERPL-MCNC: 1.9 MG/DL — SIGNIFICANT CHANGE UP (ref 1.6–2.6)
MCHC RBC-ENTMCNC: 26.7 PG — LOW (ref 27–34)
MCHC RBC-ENTMCNC: 32.9 G/DL — SIGNIFICANT CHANGE UP (ref 32–36)
MCV RBC AUTO: 81.2 FL — SIGNIFICANT CHANGE UP (ref 80–100)
MONOCYTES # BLD AUTO: 0.72 K/UL — SIGNIFICANT CHANGE UP (ref 0–0.9)
MONOCYTES NFR BLD AUTO: 7.7 % — SIGNIFICANT CHANGE UP (ref 2–14)
NEUTROPHILS # BLD AUTO: 6.56 K/UL — SIGNIFICANT CHANGE UP (ref 1.8–7.4)
NEUTROPHILS NFR BLD AUTO: 70 % — SIGNIFICANT CHANGE UP (ref 43–77)
NITRITE UR-MCNC: NEGATIVE — SIGNIFICANT CHANGE UP
NRBC # BLD AUTO: 0 K/UL — SIGNIFICANT CHANGE UP (ref 0–0)
NRBC # FLD: 0 K/UL — SIGNIFICANT CHANGE UP (ref 0–0)
NRBC BLD AUTO-RTO: 0 /100 WBCS — SIGNIFICANT CHANGE UP (ref 0–0)
PH UR: 8 — SIGNIFICANT CHANGE UP (ref 5–8)
PLATELET # BLD AUTO: 269 K/UL — SIGNIFICANT CHANGE UP (ref 150–400)
POTASSIUM SERPL-MCNC: 3.6 MMOL/L — SIGNIFICANT CHANGE UP (ref 3.5–5.3)
POTASSIUM SERPL-SCNC: 3.6 MMOL/L — SIGNIFICANT CHANGE UP (ref 3.5–5.3)
PROT SERPL-MCNC: 7.7 G/DL — SIGNIFICANT CHANGE UP (ref 6–8.3)
PROT UR-MCNC: SIGNIFICANT CHANGE UP MG/DL
RBC # BLD: 3.93 M/UL — SIGNIFICANT CHANGE UP (ref 3.8–5.2)
RBC # FLD: 14.9 % — HIGH (ref 10.3–14.5)
RBC CASTS # UR COMP ASSIST: 3 /HPF — SIGNIFICANT CHANGE UP (ref 0–4)
SODIUM SERPL-SCNC: 143 MMOL/L — SIGNIFICANT CHANGE UP (ref 135–145)
SP GR SPEC: 1.02 — SIGNIFICANT CHANGE UP (ref 1–1.03)
SQUAMOUS # UR AUTO: 4 /HPF — SIGNIFICANT CHANGE UP (ref 0–5)
TROPONIN T, HIGH SENSITIVITY RESULT: <6 NG/L — SIGNIFICANT CHANGE UP
UROBILINOGEN FLD QL: 1 MG/DL — SIGNIFICANT CHANGE UP (ref 0.2–1)
WBC # BLD: 9.38 K/UL — SIGNIFICANT CHANGE UP (ref 3.8–10.5)
WBC # FLD AUTO: 9.38 K/UL — SIGNIFICANT CHANGE UP (ref 3.8–10.5)
WBC UR QL: 0 /HPF — SIGNIFICANT CHANGE UP (ref 0–5)

## 2025-05-31 PROCEDURE — 71046 X-RAY EXAM CHEST 2 VIEWS: CPT | Mod: 26

## 2025-05-31 PROCEDURE — 99285 EMERGENCY DEPT VISIT HI MDM: CPT

## 2025-05-31 PROCEDURE — 93010 ELECTROCARDIOGRAM REPORT: CPT

## 2025-05-31 RX ADMIN — Medication 1000 MILLILITER(S): at 22:29

## 2025-05-31 NOTE — ED ADULT NURSE NOTE - CHIEF COMPLAINT QUOTE
pt c/o sob, generalized weakness. Son states pt hasn't been sleeping. Denies know med hx or daily meds. pt hyperventilating, minimally cooperative with triage. unable to obtain EKG taken to ambulance area

## 2025-05-31 NOTE — ED PROVIDER NOTE - CLINICAL SUMMARY MEDICAL DECISION MAKING FREE TEXT BOX
Plan EKG, labs, chest x-ray and reassess.  EKG heart rate 98 sinus with T wave inversion in lead III and V3.  Advised patient that she will need to follow-up with crisis center once labs are resulted.  Patient ANO x 3 agreeable with plan.

## 2025-05-31 NOTE — ED ADULT NURSE NOTE - OBJECTIVE STATEMENT
Pt brought to room 5 with son.  Pt c/o SOB, generalized weakness.  Son states pt hasn't been sleeping well.  Pt appears anxious and was hyperventilating upon arrival.  Pt able to be calmed with verbal support from staff.  Pt denies daily meds or medical history.  EKG performed in triage.  Pt seen yesterday at [facility] and discharged with plan to see PCP on June 12th.  Pt reports yesterday she felt numbness to left arm and feeling like her throat was closing.  Pt denies those symptoms this evening.  States she vomited after eating earlier today.  Pt was tachy on arrival but now HR is WNL on cardiac monitor with NSR.  Pt c/o of generalized weakness and appears less anxious.  20g iv placed to right AC.  Labs drawn and sent.  Pt awaiting XR.

## 2025-05-31 NOTE — ED PROVIDER NOTE - NSFOLLOWUPINSTRUCTIONS_ED_ALL_ED_FT
Hoy lo evaluaron en Urgencias por debilidad generalizada, entumecimiento y dificultad para respirar. Gutiérrez evaluación indicó que karina síntomas probablemente se deban a ansiedad.    Por favor, acuda al [facility] el lunes para cynthia a un psiquiatra. El horario es de 9:00 a. m. a 3:00 p. m.    [address] [place] [address], [facility], [place] [address]. [phone].    También le recomendamos programar harmony aquiles con gutiérrez médico de cabecera, si es posible, dentro de las 48 a 72 horas.    Regrese a Urgencias si presenta dolor en el pecho, aumento de la dificultad para respirar o cualquier otro síntoma preocupante.    Lynne por elegirnos para gutiérrez atención.    ___    You have been evaluated in the Emergency Department today for generalized weakness, numbness, and difficulty breathing. Your evaluation showed that your symptoms are likely due to anxiety.    Please go to the [facility] on Monday to see a psychiatrist. They are open 9 AM to 3 PM.   -[address] [place] [address], [address] Floor, [place] [address]. [phone].    Please also schedule an appointment with your primary care physician, within 48-72 hours if possible.    Return to the Emergency Department if you experience chest pain, worsening shortness of breath, or any other concerning symptoms.    Thank you for choosing us for your care.

## 2025-05-31 NOTE — ED PROVIDER NOTE - OBJECTIVE STATEMENT
[FreeTextEntry1] : Possible Infection   {JR } Dr Shelton  40-year-old female denies any significant past medical history presents from home with son chief complaint of generalized weakness, a tightness in her throat and discomfort of both of her hands like a tingling sensation left greater than right.  She has been feeling very anxious for the last couple days, went to another hospital yesterday was evaluated and discharged.  Told to follow-up with a primary doctor on June 12.  But symptoms for continued prompted ER visit.  Spoke to patient's son Gael at bedside and patient in Khmer at bedside patient states she has had anxiety before.  Endorsing sadness, cries and feel weak overall.  But denies any SI or HI.  Denies any auditory visual hallucinations.  Denies any alcohol or drug use.  Has never been evaluated by psychiatrist or therapist but is willing to see 1.  Currently not on medications.  Denies chest pain denies abdominal pain denies dysuria states she is urinating more frequently states "after you urinate I feel calm".  Denies pregnancy last menstrual cycle was 5/26/2025.  Patient at home is taking Tylenol, ibuprofen, Augmentin for recent tooth extraction and acyclovir for unknown etiology.

## 2025-05-31 NOTE — ED ADULT TRIAGE NOTE - CHIEF COMPLAINT QUOTE
pt c/o sob, generalized weakness. Son states pt hasn't been sleeping. Denies know med hx or daily meds. pt hyperventilating, minimally cooperative with triage. pt c/o sob, generalized weakness. Son states pt hasn't been sleeping. Denies know med hx or daily meds. pt hyperventilating, minimally cooperative with triage. unable to obtain EKG taken to ambulance area

## 2025-05-31 NOTE — ED PROVIDER NOTE - PROGRESS NOTE DETAILS
Lida Miller M.D. (Resident Physician): W/u non-actonable. pt doing okay. Will dc. Dr. Shelton informed in Vietnamese to follow-up with the Crisis Center within 1-2 days. Patient calm.  ANO x 3.  Resting with her son at bedside.  Reviewed results of blood work with patient and son in Turkish and English.  CBC stable no white count.  CMP unremarkable LFTs normal troponin negative hCG negative.  Urine has moderate leuks no nitrites and 0 white blood cells.  Chest x-ray wet read normal.  Spoke to the patient importance of her following up at [facility] for anxiety/panic attacks.  Patient verbalized understanding in Turkish and feels comfortable following up.  Recommend her returning to the emergency department for any SI or HI.  Any auditory visual hallucinations.  Will give information for the crisis center and discharged with copies of results.  Patient and son agreeable to plan.

## 2025-05-31 NOTE — ED PROVIDER NOTE - PATIENT PORTAL LINK FT
You can access the FollowMyHealth Patient Portal offered by [facility] by registering at the following website: [email or web address]. By joining [facility]’s FollowMyHealth portal, you will also be able to view your health information using other applications (apps) compatible with our system.

## 2025-05-31 NOTE — ED ADULT NURSE NOTE - NSFALLRISK_ED_ALL_ED
Patient is scheduled for her Stat Ct tomorrow 6/27. Do you want results called and should the patient stay.    Please Advise   No

## 2025-05-31 NOTE — ED PROVIDER NOTE - PHYSICAL EXAMINATION
Dr Shelton  Vital signs did initially heart rate 115 in triage on the EKG heart rate 98 sinus tachypneic not hypoxic satting 99% on room air.  Blood pressure 142/74.  Patient ANO x 3 appears anxious but nontoxic.  No facial symmetry.  No slurred speech.  Supple neck.  Normal  bilaterally normal sensation face arms and legs.  Normal strength of both lower extremities.  No pronator drift.  Able to ambulate to the bathroom in the ER.  Able to sit up in bed herself without any assistance.  Nontender cervical thoracic or lumbar spine with no bruising or ecchymosis of chest, back or abdomen.  Abdomen soft nontender.  No leg swelling.

## 2025-05-31 NOTE — ED ADULT NURSE NOTE - NSFALLRISKINTERV_ED_ALL_ED

## 2025-05-31 NOTE — ED ADULT TRIAGE NOTE - BEFAST SPEECH PHRASE
Hepatobiliary scan reviewed.  No cystic duct obstruction to suggest acute cholecystitis.  Normal gallbladder function arguing against chronic cholecystitis or biliary colic.  Changes on imaging seen around the gallbladder may be due to the patient's somewhat recent ablation.  I do not see any indication or benefit in proceeding with cholecystectomy.  No plans for surgery, please call with further questions.  Ray Maguire MD  General Surgery, Office [phone]   Yes

## 2025-06-01 PROBLEM — I10 ESSENTIAL (PRIMARY) HYPERTENSION: Chronic | Status: ACTIVE | Noted: 2022-04-09

## 2025-06-02 LAB
CULTURE RESULTS: NO GROWTH — SIGNIFICANT CHANGE UP
SPECIMEN SOURCE: SIGNIFICANT CHANGE UP